# Patient Record
Sex: FEMALE | Race: WHITE
[De-identification: names, ages, dates, MRNs, and addresses within clinical notes are randomized per-mention and may not be internally consistent; named-entity substitution may affect disease eponyms.]

---

## 2017-10-15 ENCOUNTER — HOSPITAL ENCOUNTER (EMERGENCY)
Dept: HOSPITAL 45 - C.EDB | Age: 37
Discharge: HOME | End: 2017-10-15
Payer: OTHER GOVERNMENT

## 2017-10-15 VITALS — TEMPERATURE: 98.24 F

## 2017-10-15 VITALS — SYSTOLIC BLOOD PRESSURE: 134 MMHG | DIASTOLIC BLOOD PRESSURE: 76 MMHG | OXYGEN SATURATION: 98 % | HEART RATE: 68 BPM

## 2017-10-15 VITALS
BODY MASS INDEX: 21.01 KG/M2 | HEIGHT: 65 IN | WEIGHT: 126.1 LBS | WEIGHT: 126.1 LBS | BODY MASS INDEX: 21.01 KG/M2 | HEIGHT: 65 IN

## 2017-10-15 DIAGNOSIS — S40.862A: Primary | ICD-10-CM

## 2017-10-15 DIAGNOSIS — Z79.899: ICD-10-CM
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
## 2017-10-15 DIAGNOSIS — Z88.8: ICD-10-CM

## 2017-10-15 NOTE — EMERGENCY ROOM VISIT NOTE
ED Visit Note


First contact with patient:  20:35


CHIEF COMPLAINT:  Tick in the left arm





HISTORY OF PRESENT ILLNESS: This 37-year-old female sent to the emergency 

department with concern for a tick bite.  She states that she noticed a tick 

embedded in her left upper arm yesterday afternoon, she had a friend attempt to 

remove the tick, but states that the head of the tick is still lodged in her 

arm. Was able to remove some of it but not all.  It has been on for about 24 

hours.   Tetanus is up-to-date.





REVIEW OF SYSTEMS:   Head:  No headache, injury or neck pain.  A Neck:  No pain

, stiffness, or swelling.  Neurological:  No headache, new changes in mental 

status, vertigo, focal weakness, numbness.  Gastrointestinal:  No abdominal pain

, blood in stools, diarrhea, loss of appetite, nausea, or vomiting.  General:  

No fever or chills, fatigue, loss of appetite, or significant recent weight 

gain or loss.





PMH:  The patient is healthy; there is no significant medical or surgical 

history. 





SOCIAL HISTORY:  Patient lives at home.  





PHYSICAL EXAM:  Vital Signs: Reviewed Nurse's notes.  The patient is alert and 

oriented, in no acute distress.  Pleasant and cooperative.  The mouth parts of 

a tick are embedded in the left lateral upper arm.  There is a small zone of 

inflammation around it.  There is no fluctuance, tenderness, or drainage.





EMERGENCY DEPARTMENT COURSE: Verbal consent was obtained from the patient to 

perform the procedure.  After chlorhexidine scrub cleansing and using sterile 

technique, the tick parts were removed with forceps after a tiny incision with 

a number 15 scalpel blade.  The area was again cleansed with chlorhexidine.  

Bacitracin and a band-aid were put on the wound.    The patient tolerated the 

procedure well.  I instructed her on signs/symptoms to monitor for, and 

encouraged her to follow up with her PCP, she verbalized understanding.  

Patient was discharged home in stable condition and ambulatory.


Current/Historical Medications


Scheduled


Cholecalciferol (Vitamin D3), 1 TAB PO DAILY


Lacosamide (Vimpat), 100 MG PO QAM


Lacosamide (Vimpat), 50 MG PO BID


Lacosamide (Vimpat), 200 MG PO QPM


Multivitamins/Minerals (Mvi With Minerals), 1 TAB PO DAILY


Topiramate (Topamax), 100 MG PO QAM


Topiramate (Topamax), 50 MG PO BID


Topiramate (Topamax), 200 MG PO QPM





Allergies


Coded Allergies:  


     Gadobutrol (Verified  Allergy, Severe, HEART PAIN, 10/15/17)


     Lamotrigine (Verified  Allergy, Intermediate, RASH, 10/15/17)


Uncoded Allergies:  


     SULFA (Allergy, Intermediate, hives, 10/15/17)





Vital Signs











  Date Time  Temp Pulse Resp B/P (MAP) Pulse Ox O2 Delivery O2 Flow Rate FiO2


 


10/15/17 21:09  68 16 134/76 98 Room Air  


 


10/15/17 19:09 36.8 71 16 114/79 98 Room Air  











Departure Information


Impression





 Primary Impression:  


 Tick bite





Dispostion


Home / Self-Care





Condition


GOOD





Referrals


Jocelyn Soares D.O. (PCP)





Patient Instructions


ED Bite Tick No Abx Tx, My [facility]





Additional Instructions





Watch the area for signs of infection, like increasing pain, redness, swelling, 

pus drainage, or if you develop fevers/chills.  





Clean the area with warm soapy water, pat dry and apply antibiotic ointment and 

a Band-Aid for the next 2 days.  





Follow-up with her PCP as needed, or for any signs of infection.





Problem Qualifiers








 Primary Impression:  


 Tick bite


 Encounter type:  initial encounter  Qualified Codes:  W57.XXXA - Bitten or 

stung by nonvenomous insect and other nonvenomous arthropods, initial encounter

## 2018-01-26 ENCOUNTER — HOSPITAL ENCOUNTER (EMERGENCY)
Dept: HOSPITAL 45 - C.EDC | Age: 38
Discharge: HOME | End: 2018-01-26
Payer: OTHER GOVERNMENT

## 2018-01-26 VITALS
BODY MASS INDEX: 12.05 KG/M2 | WEIGHT: 81.35 LBS | HEIGHT: 69.02 IN | HEIGHT: 69.02 IN | WEIGHT: 81.35 LBS | BODY MASS INDEX: 12.05 KG/M2

## 2018-01-26 VITALS — HEART RATE: 67 BPM | DIASTOLIC BLOOD PRESSURE: 66 MMHG | SYSTOLIC BLOOD PRESSURE: 103 MMHG | OXYGEN SATURATION: 99 %

## 2018-01-26 VITALS — TEMPERATURE: 98.42 F

## 2018-01-26 DIAGNOSIS — R00.0: ICD-10-CM

## 2018-01-26 DIAGNOSIS — R56.9: Primary | ICD-10-CM

## 2018-01-26 NOTE — EMERGENCY ROOM VISIT NOTE
History


First contact with patient:  11:42


Chief Complaint:  SEIZURE


Stated Complaint:  SEIZURE


Nursing Triage Summary:  


Pt presents from home via ALS for a seizure. Pt has long standing hx of 

seizures 


since she was 15, but verbalizes no one believed her until she was 29 years old 


when she finally started being medicated for them. Pt verbalizes she has 


seizures daily and is following with the [facility] for this. Takes 

Vipmat 


and Topamax daily, has not missed any doses. Pt verbalizes the reason she came 


to ED today is because this seizure was worse than any that she has had in a 


long time. Pt is alert and oriented upon arrival, no incontinence during 


seizure, only complaint is left arm pain, feeling cold, and has a headache, all 


which she verbalizes are normal post ictal.





History of Present Illness


The patient is a 37 year old female who presents to the Emergency Room via ALS 

with complaints of having a seizure.  The patient states that she has a long-

standing history of seizures.  Over the past 6 months she has been getting 

seizures on a daily basis.  She was living in [facility] and was treated 

by neurology there.  She moved to the area 5 months ago.  She went and saw 

[facility], Dr. Trenton Nash last week.  He did adjust some of her 

medications.  She has a follow-up appointment scheduled on February 12 for a 

video EEG were she will be admitted for one week.  The patient states the only 

reason she came in today was that with her seizure she had at most a 5 minute 

time where she was paralyzed.  The patient states normally she gets left arm 

paralysis after her seizures which is normal.  She does have that today.  She 

also gets a headache post ictal which she currently rates at a 1 out of 10.  

The patient was alone when she had a seizure but she called her  and he 

stated she did not talk for 40 seconds and then she was able to talk and 

informed him of her paralysis.  Seizure started at approximately 949.  She 

states it only lasted a few minutes.  The patient currently is just tired.  She 

has been taking all her medicines and has not missed any dosing.





Review of Systems


10 system review was performed and was negative unless stated otherwise history 

of present illness.





Past Medical/Surgical History


Seizures, tachycardia





Social History


Smoking Status:  Never Smoker


Alcohol Use:  none


Drug Use:  none


Marital Status:  


Housing Status:  lives with family





Current/Historical Medications


Scheduled


Calcium Carbonate-Vitamin D (Calcium + D), 1 TAB PO DAILY


Cholecalciferol (Vitamin D), 2,000 UNITS PO DAILY


Drospirenone-Ethinyl Estradiol (Ocella), 1 TAB PO DAILY


Lacosamide (Vimpat), 50 MG PO BID


Lacosamide (Vimpat), 300 MG PO BID


Multivitamins/Minerals (Mvi With Minerals), 1 TAB PO DAILY


Topiramate (Topamax), 100 MG PO QAM


Topiramate (Topamax), 50 MG PO BID


Topiramate (Topamax), 200 MG PO QPM


[Onfi], 15 MG PO QAM


[Onfi], 20 MG PO QPM





Scheduled PRN


Lorazepam (Ativan), 1-2 MG PO DAILY PRN for SEIZURES





Physical Exam


Vital Signs











  Date Time  Temp Pulse Resp B/P (MAP) Pulse Ox O2 Delivery O2 Flow Rate FiO2


 


1/26/18 12:50  67 20 103/66 99   


 


1/26/18 11:06  70      


 


1/26/18 11:03 36.9 73 18 119/82 100 Room Air  











Physical Exam


GENERAL: 37-year-old female appears in no acute distress.


MENTAL Status: Alert and oriented 3.  The patient appears fatigued.


EYES: PERRLA.  EOMs intact.


EARS: Canals clear.  TMs without fluid level noted.


NECK: Supple, no lymphadenopathy noted.  No carotid bruits noted.


LUNGS: Clear auscultation without wheezes rales or rhonchi.


CARDIAC: Regular rate and rhythm without murmur.  Pulses is full and equal 

throughout.


ABDOMEN: Positive bowel sounds all 4 quadrants.  Soft, nontender to palpation 

without organomegaly or masses.


NEURO:Cranial nerves two through 12 intact. Cerebellar function intact with 

finger-to-nose. Fine motor intact with alternating finger motions.


BILATERAL upper extremities.  The patient's left upper extremity is weak with a 

muscle strength of 2 out of 5 as compared to 5 out of 5 on the right.  Lower 

extremity strength is 5 out of 5 and symmetrical.





Medical Decision & Procedures


ECG


Indication:  other


Rhythm:  normal sinus


Findings:  no acute ischemic change





ED Course


The patient was evaluated.  EKG had been ordered by protocol.  This was 

reviewed by myself with normal sinus rhythm with no acute changes.  I consulted 

[facility] about the patient to see if there was any diagnostic imaging 

or laboratory testing that would be warranted at this time due to her new 

symptom of paralysis.  They informed me that no additional testing was 

necessary.  She is to keep her scheduled follow-up appointment on February 12.  

The patient's case was discussed with Dr. Hutchinson who agree with treatment 

plan.  The patient was discharged home in stable condition.





Medical Decision


The patient has an ongoing history of seizures.  She had a new finding of 

paralysis , therefore I contacted [facility] who is following her 

seizures to see if there was any additional testing which were warranted at 

this time and there was not therefore she was discharged home in stable 

condition with her .





[place] Drug Monitoring Program


Search Results:  patient reviewed within database





Medication Reconcilliation


Current Medication List:  was personally reviewed by me





Blood Pressure Screening


Patient's blood pressure:  Normal blood pressure





Impression





 Primary Impression:  


 Seizure





Departure Information


Dispostion


Home / Self-Care





Condition


GOOD





Referrals


Jocelyn Soares D.O. (PCP)





Forms


HOME CARE DOCUMENTATION FORM,                                                 

               IMPORTANT VISIT INFORMATION





Patient Instructions


ED Seizure Recurrent, [facility], [facility]





Additional Instructions





Continue all medications as prescribed.  Go home and rest for the remainder of 

the day.  Keep scheduled appointment with [facility] on February 12.